# Patient Record
Sex: MALE | Race: WHITE | URBAN - METROPOLITAN AREA
[De-identification: names, ages, dates, MRNs, and addresses within clinical notes are randomized per-mention and may not be internally consistent; named-entity substitution may affect disease eponyms.]

---

## 2020-02-28 ENCOUNTER — MEDICAL CORRESPONDENCE (OUTPATIENT)
Dept: HEALTH INFORMATION MANAGEMENT | Facility: CLINIC | Age: 45
End: 2020-02-28

## 2020-07-15 ENCOUNTER — TRANSFERRED RECORDS (OUTPATIENT)
Dept: HEALTH INFORMATION MANAGEMENT | Facility: CLINIC | Age: 45
End: 2020-07-15

## 2020-10-16 ENCOUNTER — TELEPHONE (OUTPATIENT)
Dept: OPHTHALMOLOGY | Facility: CLINIC | Age: 45
End: 2020-10-16

## 2020-10-26 NOTE — TELEPHONE ENCOUNTER
10/26/2020 8:55AM Advised Lalitha that I have left two messages for Patricia, but he has not returned my calls. She states it was his wife who called her and while she was speaking with the wife, she confirmed with Patricia that he did receive messages. Lalitha will relay that Patricia needs to return my calls to schedule.    10/23/2020 3:58PM Lalitha LVM stating that Patricia has not heard anything from us to schedule requested testing. Please call back with status.

## 2020-10-28 DIAGNOSIS — H35.50 RETINAL DYSTROPHY: Primary | ICD-10-CM

## 2020-10-28 DIAGNOSIS — H35.89 OTHER SPECIFIED RETINAL DISORDERS: ICD-10-CM

## 2020-11-02 ENCOUNTER — ALLIED HEALTH/NURSE VISIT (OUTPATIENT)
Dept: OPHTHALMOLOGY | Facility: CLINIC | Age: 45
End: 2020-11-02
Attending: OPHTHALMOLOGY
Payer: COMMERCIAL

## 2020-11-02 DIAGNOSIS — H35.50 RETINAL DYSTROPHY: ICD-10-CM

## 2020-11-02 DIAGNOSIS — H35.89 OTHER SPECIFIED RETINAL DISORDERS: ICD-10-CM

## 2020-11-02 PROCEDURE — 92083 EXTENDED VISUAL FIELD XM: CPT

## 2020-11-02 PROCEDURE — 92273 FULL FIELD ERG W/I&R: CPT

## 2020-11-02 PROCEDURE — 999N000103 HC STATISTIC NO CHARGE FACILITY FEE

## 2020-11-02 PROCEDURE — 99207 PR NO CHARGE LOS: CPT

## 2020-11-02 ASSESSMENT — REFRACTION_WEARINGRX
OD_SPHERE: -2.50
OD_CYLINDER: +0.25
OS_AXIS: 032
SPECS_TYPE: PAL
OS_CYLINDER: +0.25
OD_ADD: +0.75
OS_ADD: +0.75
OS_SPHERE: -3.75
OD_AXIS: 045

## 2020-11-02 ASSESSMENT — VISUAL ACUITY
CORRECTION_TYPE: GLASSES
OD_CC: 20/20
OD_CC+: -2
METHOD: SNELLEN - LINEAR
OS_CC+: -2
OS_CC: 20/20

## 2020-11-02 NOTE — LETTER
"2020    STANDARD ERG REPORT    Referring:  Dr. Tyra Vaca/PN-NeuroOphth     RE:  Patricia Castillo  MRN:  [id number]  :  1975    ERG Date:  2020    CLINICAL HISTORY: Patricia Castillo is a 44-year-old patient referred by Dr. Vaca for a full-field electroretinogram (ERG) with possible diagnosis of retinal dystrophy. Last eye exam w/Dr. Vaca 07-15-20:    constant head pressure and brain fog that began in late March/early April .Around the end of May he started noticing problems with his vision, decreased vision in dim rooms, could not see with sunglasses on when previously he could, and trouble seeing things on his spreadsheets and computer screens .Normal MRI 6720, normal  .Family history significant for a male cousin with retinitis pigmentosa.   Pt also with concerns of methanol toxicity from use of hand  from March to early/mid-2020 since vision probs happened about the same time. Pt has noticed increased difficulty transitioning from light to dark settings.  Bothered by \"after-images\".        IMPRESSION: 1. Likely normal electroretinogram (ERG)     2. Mild, nonspecific electroretinogram changes of unknown clinical significance                 Visual Acuity with glasses: Right Eye: 20/20-2        -2.50 +0.25 x 045        Left Eye: 20/20-2        -3.75 +0.25 x 032                                       ALL AVERAGED  Data for Full-Field ERG Right Eye   Left Eye    Dark-Adapted Patient Normal Patient   0.01 ERG (hermelinda) amplitude( v) 169* 61.3-334.7 173*   0.01 ERG (hermelinda) implicit time(ms) 81* 75.1-108.0 79.9*   MMMMM      3.0 ERG (combined) a-wave amplitude( v) -189 -195.0 to -96.5 -193   3.0 ERG (combined) a-wave implicit time(ms) 16.6 15.2-18.2 16.6   3.0 ERG (combined) b-wave amplitude( v) 280 115.0-446.3 310   3.0 ERG (combined) b-wave implicit time(ms) 47.7 30.0-50.3 52.1   MMMMM      3.0 Oscillatory Potentials   Present     Light-Adapted      3.0 Flicker (30-Hz) " amplitude( v) 76(72) 68.5-147.5 70(74)   3.0 Flicker (30-Hz) implicit time(ms) 26.1(58.8) 21.5-26.8 26.1(59.4)         3.0 ERG (cone) a-wave amplitude( v) -29 -59.8 to -24.4 -32   3.0 ERG (cone) a-wave implicit time(ms) 15.5 14.9-18.0 14.4   3.0 ERG (cone) b-wave amplitude( v) 99 86.1-205.2 111   3.0 ERG (cone) b-wave implicit time(ms) 29.4 25.8-29.6 30                         * = manipulated cursors                        parentheses = cursors at selected peaks                         ---- = residual to non-measurable                        xxxx = not tested      INTERPRETATION:   This electroretinogram was performed according to ISCEV standards using the [employer] E3 system and DTL fiber-recording electrodes. The patient tolerated the testing well. The waveforms are fairly reproducible and well formed. The responses in between both eyes were similar. The normative values provided above represent the 95% confidence limits for a normal individual the age of the patient. The patient s responses are averaged.    In scotopic conditions, the hermelinda-specific responses were normal in both eyes. The maximal response, a combined hermelinda and cone response, was essentially normal and the implicit time was normal in both eyes. The implicit time was delayed in the b-wave left eye.     In light-adapted conditions, the 30-Hz flicker response has a normal amplitude and normal implicit time in both eyes. The single photopic flash responses are normal, except for delayed single cone b-wave left eye.     CONCLUSION: This represents a likely normal electroretinogram. There are mild, nonspecific electroretinogram changes of unknown clinical significance. There is mild delay in the implicit time in the b-wave combined responses and single cone b-wave response of the left eye and this is of unclear significance. Certainly, there is no significant loss of hermelinda or cone photoreceptors.  Clinical correlation is recommended.       I would recommend a  repeat electroretinogram in a couple of years if there continues to be concern regarding a possible retinal dystrophy.               STANDARD GVF REPORT              GVF Date:  11/2/2020    IMPRESSION:  1. Few superior mid-peripheral areas of decreased sensitivity left eye     2. No constriction of the visual field and no scotomas    PRELIMINARY INTERPRETATION:       Right Eye:   Fixation: Good  Blind spot: Normal size to II4e  Findings: The peripheral isopters extended horizontally 140 degrees.  No other scotomas were identified.    Left Eye:  Fixation: Good  Blind spot: Normal size to II4e  Findings:  The peripheral isopters extended horizontally 135 degrees. There were superior areas of decreased sensitivity left eye, but no scotomas were identified.  Per tech notes, left eye noted with significant ?brow ptosis but able to open fully when instructed.  I2e isopter improved with lid lift but still with inconsistent, slow static check superiorly.         Tyra, thank you for the opportunity to provide electrophysiologic services for this patient.  Please do not hesitate to call if there should be any questions regarding these results.    Sincerely,    Pina Victoria MD     Retina Service   Department of Ophthalmology and Visual Neurosciences   [facility]  Phone: [phone]   Fax: [phone]

## 2020-11-02 NOTE — NURSING NOTE
"Referred by Dr. Tyra Vaca/-NeuroOphth for GVF+ffERG.  Last eye exam w/Dr. Vaca 07-15-20:    constant head pressure and brain fog that began in late March/early April .Around the end of May he started noticing problems with his vision, decreased vision in dim rooms, could not see with sunglasses on when previously he could, and trouble seeing things on his spreadsheets and computer screens .Normal MRI 6-7-20, normal MRV 6-18-20 .Family history significant for a male cousin with retinitis pigmentosa.   Pt also w/concerns of methanol toxicity from use of hand  from March to early/mid-June 2020 since vision probs happened about the same time.  Pt has noticed increased difficulty transitioning from light to dark settings.  Bothered by \"after-images\".  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  No f/u yet scheduled for Dr. Vaca; will await results.    "

## 2020-11-03 NOTE — PROGRESS NOTES
"2020    STANDARD GVF REPORT    Referring:  Dr. Tyra Vaca/PN-NeuroOphth    RE: Patricia Castillo  MRN: [id number]  : 1975                 GVF Date:  2020    CLINICAL HISTORY: Patricia Castillo is a 44 year old year-old patient referred by Dr. Vaca for a full field electroretinogram (ERG) with possible diagnosis of  Retinal dystrophy. Last eye exam w/Dr. Vaca 07-15-20:    constant head pressure and brain fog that began in late March/early April .Around the end of May he started noticing problems with his vision, decreased vision in dim rooms, could not see with sunglasses on when previously he could, and trouble seeing things on his spreadsheets and computer screens .Normal MRI 6-7-20, normal -20 .Family history significant for a male cousin with retinitis pigmentosa.   Pt also with concerns of methanol toxicity from use of hand  from March to early/mid-2020 since vision probs happened about the same time. Pt has noticed increased difficulty transitioning from light to dark settings.  Bothered by \"after-images\".        IMPRESSION:  1. Few superior mid-peripheral areas of decreased sensitivity left eye     2. No constriction of the visual field and no scotomas    Visual Acuity Right Eye : 20/20-2      w/gls, -2.50 + 0.25x045    Visual Acuity Left Eye : 20/20-2      w/gls, -3.75 + 0.25x032        PRELIMINARY INTERPRETATION:       Right eye:   Fixation: good  Blind spot: normal size to II4e  Findings: The peripheral isopters extended horizontally 140 degrees.  No other scotomas and were identified.    Left eye:  Fixation: good  Blind spot: normal size to II4e  Findings:  The peripheral isopters extended horizontally 135 degrees. There were superior areas of decreased sensitivity left eye, but No scotomas and were identified.  Per tech notes Left eye noted with significant ?brow ptosis but able to open fully when instructed.  I2e isopter improved with lid lift but still with " inconsistent, slow static check superiorly.     Sincerely,    Pina Victoria MD  .  Retina Service   Department of Ophthalmology and Visual Neurosciences   [facility]  Phone: [phone]   Fax: [phone]

## 2020-11-03 NOTE — PROGRESS NOTES
"2020    STANDARD ERG REPORT    Referring:  Dr. Tyra Vaca/PN-NeuroOphth     RE:  Patricia Castillo  MRN:  [id number]  :  1975    ERG Date:  2020    CLINICAL HISTORY: Patricia Castillo is a 44 year old year-old patient referred by Dr. Vaca for a full field electroretinogram (ERG) with possible diagnosis of  Retinal dystrophy. Last eye exam w/Dr. Vaca 07-15-20:    constant head pressure and brain fog that began in late March/early April .Around the end of May he started noticing problems with his vision, decreased vision in dim rooms, could not see with sunglasses on when previously he could, and trouble seeing things on his spreadsheets and computer screens .Normal MRI 6-7-20, normal  .Family history significant for a male cousin with retinitis pigmentosa.   Pt also with concerns of methanol toxicity from use of hand  from March to early/mid-2020 since vision probs happened about the same time. Pt has noticed increased difficulty transitioning from light to dark settings.  Bothered by \"after-images\".        IMPRESSION:   1. likely Normal electroretinogram (ERG)       2. mild, non-specific electroretinogram changes of unknown clinical significance.                 Visual Acuity Right Eye : 20/20-2      w/gls, -2.50 + 0.25x045    Visual Acuity Left Eye : 20/20-2      w/gls, -3.75 + 0.25x032                        ALL AVERAGED  Data for Full-Field ERG Right Eye   Left Eye    Dark-Adapted Patient Normal Patient   0.01 ERG (hermelinda) amplitude( v) 169* 61.3-334.7 173*   0.01 ERG (hermelinda) implicit time(ms) 81* 75.1-108.0 79.9*   MMMMM      3.0 ERG (combined) a-wave amplitude( v) -189 -195.0 to -96.5 -193   3.0 ERG (combined) a-wave implicit time(ms) 16.6 15.2-18.2 16.6   3.0 ERG (combined) b-wave amplitude( v) 280 115.0-446.3 310   3.0 ERG (combined) b-wave implicit time(ms) 47.7 30.0-50.3 52.1   MMMMM      3.0 Oscillatory Potentials   Present     Light-Adapted      3.0 Flicker (30-Hz) " amplitude( v) 76(72) 68.5-147.5 70(74)   3.0 Flicker (30-Hz) implicit time(ms) 26.1(58.8) 21.5-26.8 26.1(59.4)         3.0 ERG (cone) a-wave amplitude( v) -29 -59.8 to -24.4 -32   3.0 ERG (cone) a-wave implicit time(ms) 15.5 14.9-18.0 14.4   3.0 ERG (cone) b-wave amplitude( v) 99 86.1-205.2 111   3.0 ERG (cone) b-wave implicit time(ms) 29.4 25.8-29.6 30         * = manipulated cursors        parentheses = cursors at selected peaks        ---- = residual to non-measurable        xxxx = not tested      INTERPRETATION:      The electroretinogram was performed according to ISCEV standards using [employer] E3 system and DTL fiber recording electrodes. The patient tolerated the testing well. The waveforms are fairly reproducible and well formed. The responses in between both eyes were similar.  The normative values provided above represent the 95% confidence limits for a normal individual the age of the patient. The patient s responses are averaged.  In scotopic conditions, the hermelinda specific responses were normal in both eyes.  The maximal response, a combined hermelinda and cone responses were essentially normal and the implicit time was normal in both eyes. The implicit time was delayed in the b wave left eye   In light adapted conditions, the 30-Hz flicker response has a normal amplitude and normal implicit time in both eyes. The single photopic flash response are normal, except for delayed single cone b-wave left eye     Conclusion:   This represents a likely normal electroretinogram. There are mild, non-specific electroretinogram changes of unknown clinical significance. There is mild delay in the implicit time in the b-wave combined responses and single cone b-wave response of the left eye and this is of unclear significance. Certainly, there is no significant loss of hermelinda or cone photoreceptors.  Clinical correlation is recommended.       I would recommend a repeated electroretinogram in a couple of years if there continues to  be concern regarding a possible retinal dystrophy.     Dear  Tyra, thank you for the opportunity to provide electrophysiologic services for this patient.  Please do not hesitate to call if there should be any questions regarding these results.    Pina Victoria MD     Retina Service   Department of Ophthalmology and Visual Neurosciences   [facility]  Phone: [phone]   Fax: [phone]